# Patient Record
Sex: FEMALE | Race: BLACK OR AFRICAN AMERICAN | NOT HISPANIC OR LATINO | URBAN - METROPOLITAN AREA
[De-identification: names, ages, dates, MRNs, and addresses within clinical notes are randomized per-mention and may not be internally consistent; named-entity substitution may affect disease eponyms.]

---

## 2020-08-17 ENCOUNTER — OFFICE VISIT (OUTPATIENT)
Dept: URBAN - METROPOLITAN AREA CLINIC 78 | Facility: CLINIC | Age: 45
End: 2020-08-17
Payer: COMMERCIAL

## 2020-08-17 DIAGNOSIS — R10.33 PERIUMBILICAL ABDOMINAL PAIN: ICD-10-CM

## 2020-08-17 PROCEDURE — 99204 OFFICE O/P NEW MOD 45 MIN: CPT | Performed by: INTERNAL MEDICINE

## 2020-08-17 PROCEDURE — 99244 OFF/OP CNSLTJ NEW/EST MOD 40: CPT | Performed by: INTERNAL MEDICINE

## 2020-08-17 RX ORDER — HYOSCYAMINE SULFATE 0.12 MG/1
1 TABLET AS NEEDED TABLET ORAL
Qty: 40 | Refills: 2 | OUTPATIENT
Start: 2020-08-17

## 2020-08-17 NOTE — PHYSICAL EXAM GASTROINTESTINAL
Abdomen , soft, tenderness to palpation in the periumbilical region, nondistended , no guarding or rigidity , no masses palpable , normal bowel sounds , Liver and Spleen , no hepatomegaly present , no hepatosplenomegaly , liver nontender , spleen not palpable

## 2020-08-17 NOTE — HPI-OTHER HISTORIES
The patient reports a 1 year history of periumbilcial abdominal pain. It occurrs intermittently and at times can be excruciating, although on most occasions it is mild. She will sometimes feel better is she turns on her side and puts a pillow in the area.  She was treated empricially with PPI daily for a few weeks. She feels that this helped partially, but not so much with the pain. She had bloodwork and US as detailed above by her PCP but states that although these came back normal, "she knows she is not crazy and that there is definitely something wrong."  There is no recent history of rectal bleeding. The patient has no pertinent additional complaints of constipation, diarrhea, bloating, rectal pain, anorexia or unintentional weight loss. Although she denies constipation she does "take a colon cleanse every couple of days because she feels she should."   Regarding any upper GI complaints, the patient has not had heartburn, nausea, vomiting or dysphagia.   The patient does not take blood thinners. The patient has never had a colonoscopy previously. There is no FH of colon cancer or colon polyps, esoph/stoamch/pancreatic cancer. They deny any CP or CALDERON.  Summary of prior workup: - Labs on 2/29/20: Gluc 90, BUN 14, Cr 1.01, Na 139, K 4.1, Cl 103, CO2 29. Ca 9.8 - Abd US on 7/19/19: Normal. Liver normal in size and contour. No mass. No gallstones or pericholecystic. No GB wall thickening. Pancreas normal where visualized.

## 2020-09-21 ENCOUNTER — OFFICE VISIT (OUTPATIENT)
Dept: URBAN - METROPOLITAN AREA CLINIC 78 | Facility: CLINIC | Age: 45
End: 2020-09-21
Payer: COMMERCIAL

## 2020-09-21 DIAGNOSIS — R10.33 PERIUMBILICAL ABDOMINAL PAIN: ICD-10-CM

## 2020-09-21 DIAGNOSIS — K59.01 CONSTIPATION: ICD-10-CM

## 2020-09-21 DIAGNOSIS — D25.9 UTERINE FIBROID: ICD-10-CM

## 2020-09-21 PROCEDURE — G9903 PT SCRN TBCO ID AS NON USER: HCPCS | Performed by: INTERNAL MEDICINE

## 2020-09-21 PROCEDURE — G8417 CALC BMI ABV UP PARAM F/U: HCPCS | Performed by: INTERNAL MEDICINE

## 2020-09-21 PROCEDURE — 99214 OFFICE O/P EST MOD 30 MIN: CPT | Performed by: INTERNAL MEDICINE

## 2020-09-21 PROCEDURE — G8427 DOCREV CUR MEDS BY ELIG CLIN: HCPCS | Performed by: INTERNAL MEDICINE

## 2020-09-21 RX ORDER — HYOSCYAMINE SULFATE 0.12 MG/1
1 TABLET AS NEEDED TABLET ORAL
Qty: 40 | Refills: 2 | Status: ACTIVE | COMMUNITY
Start: 2020-08-17

## 2020-09-21 NOTE — HPI-OTHER HISTORIES
The patient reports a 1 year history of periumbilcial abdominal pain. It occurrs intermittently and at times can be excruciating, although on most occasions it is mild. She will sometimes feel better is she turns on her side and puts a pillow in the area.  She was treated empricially with PPI daily for a few weeks. She feels that this helped partially, but not so much with the pain. She had bloodwork and US as detailed above by her PCP but states that although these came back normal, "she knows she is not crazy and that there is definitely something wrong."  There is no recent history of rectal bleeding. The patient has no pertinent additional complaints of constipation, diarrhea, bloating, rectal pain, anorexia or unintentional weight loss. Although she denies constipation she does "take a colon cleanse every couple of days because she feels she should." All she knows is that she has never been "regular." She tells me today that for the past week she has not experienced any pain at all and as a matter of fact has not even had to use the Levsin. She wonders if she had been sitting in a chair at home which caused her to be in an unusual position and this may have been triggering her pain at times.  Regarding any upper GI complaints, the patient has not had heartburn, nausea, vomiting or dysphagia.  Today we reviewed the results of her recent CT A/P.  The patient does not take blood thinners. The patient has never had a colonoscopy previously. There is no FH of colon cancer or colon polyps, esoph/stoamch/pancreatic cancer in any first degree relatives. She did remember that an aunt had  colon cancer. She is originally from [facility]. They deny any CP or CALDERON.  Summary of prior workup: - CT A/P on 9/18/20: Stool filled sigm colon, uterine fibroids. Attentuation of midline abdominal wall fascia at the umbilicus. O/w normal liver, GB, pancreas, adrenals and kidneys. No concerning LAD. Patent vessels. - Labs on 2/29/20: Gluc 90, BUN 14, Cr 1.01, Na 139, K 4.1, Cl 103, CO2 29. Ca 9.8 - Abd US on 7/19/19: Normal. Liver normal in size and contour. No mass. No gallstones or pericholecystic. No GB wall thickening. Pancreas normal where visualized.

## 2020-10-30 ENCOUNTER — OFFICE VISIT (OUTPATIENT)
Dept: URBAN - METROPOLITAN AREA TELEHEALTH 2 | Facility: TELEHEALTH | Age: 45
End: 2020-10-30

## 2020-10-30 ENCOUNTER — DASHBOARD ENCOUNTERS (OUTPATIENT)
Age: 45
End: 2020-10-30

## 2020-10-30 RX ORDER — HYOSCYAMINE SULFATE 0.12 MG/1
1 TABLET AS NEEDED TABLET ORAL
Qty: 40 | Refills: 2 | Status: ACTIVE | COMMUNITY
Start: 2020-08-17

## 2020-10-30 NOTE — HPI-OTHER HISTORIES
The patient reports a 1 year history of periumbilcial abdominal pain. It occurrs intermittently and at times can be excruciating, although on most occasions it is mild. She will sometimes feel better is she turns on her side and puts a pillow in the area.  She was treated empricially with PPI daily for a few weeks. She feels that this helped partially, but not so much with the pain. She had bloodwork and US as detailed above by her PCP but states that although these came back normal, "she knows she is not crazy and that there is definitely something wrong."  There is no recent history of rectal bleeding. The patient has no pertinent additional complaints of constipation, diarrhea, bloating, rectal pain, anorexia or unintentional weight loss. Although she denies constipation she does "take a colon cleanse every couple of days because she feels she should." All she knows is that she has never been "regular." She tells me today that for the past week she has not experienced any pain at all and as a matter of fact has not even had to use the Levsin. She wonders if she had been sitting in a chair at home which caused her to be in an unusual position and this may have been triggering her pain at times.  Regarding any upper GI complaints, the patient has not had heartburn, nausea, vomiting or dysphagia.  Today we reviewed the results of her recent CT A/P.  The patient does not take blood thinners. The patient has never had a colonoscopy previously. There is no FH of colon cancer or colon polyps, esoph/stoamch/pancreatic cancer in any first degree relatives. She did remember that an aunt had  colon cancer. She is originally from [facility]. They deny any CP or CALDERON.  Summary of prior workup: - CT A/P on 9/18/20: Stool filled sigm colon, uterine fibroids. Attentuation of midline abdominal wall fascia at the umbilicus. O/w normal liver, GB, pancreas, adrenals and kidneys. No concerning LAD. Patent vessels. - Labs on 2/29/20: Gluc 90, BUN 14, Cr 1.01, Na 139, K 4.1, Cl 103, CO2 29. Ca 9.8 - Abd US on 7/19/19: Normal. Liver normal in size and contour. No mass. No gallstones or pericholecystic. No GB wall thickening. Pancreas normal where visualized.

## 2021-08-24 ENCOUNTER — OFFICE VISIT (OUTPATIENT)
Dept: URBAN - METROPOLITAN AREA SURGERY CENTER 13 | Facility: SURGERY CENTER | Age: 46
End: 2021-08-24
Payer: COMMERCIAL

## 2021-08-24 PROCEDURE — 45378 DIAGNOSTIC COLONOSCOPY: CPT | Performed by: INTERNAL MEDICINE

## 2021-08-24 PROCEDURE — G8907 PT DOC NO EVENTS ON DISCHARG: HCPCS | Performed by: INTERNAL MEDICINE

## 2023-04-12 ENCOUNTER — TELEPHONE ENCOUNTER (OUTPATIENT)
Dept: URBAN - METROPOLITAN AREA CLINIC 23 | Facility: CLINIC | Age: 48
End: 2023-04-12